# Patient Record
Sex: MALE | Race: BLACK OR AFRICAN AMERICAN | NOT HISPANIC OR LATINO | URBAN - METROPOLITAN AREA
[De-identification: names, ages, dates, MRNs, and addresses within clinical notes are randomized per-mention and may not be internally consistent; named-entity substitution may affect disease eponyms.]

---

## 2021-05-14 ENCOUNTER — WALK IN (OUTPATIENT)
Dept: URGENT CARE | Age: 31
End: 2021-05-14

## 2021-05-14 VITALS
TEMPERATURE: 97 F | RESPIRATION RATE: 14 BRPM | SYSTOLIC BLOOD PRESSURE: 118 MMHG | BODY MASS INDEX: 29.95 KG/M2 | HEART RATE: 78 BPM | DIASTOLIC BLOOD PRESSURE: 72 MMHG | WEIGHT: 245.92 LBS | HEIGHT: 76 IN | OXYGEN SATURATION: 98 %

## 2021-05-14 DIAGNOSIS — Z11.1 SCREENING-PULMONARY TB: ICD-10-CM

## 2021-05-14 DIAGNOSIS — Z02.1 PRE-EMPLOYMENT HEALTH SCREENING EXAMINATION: Primary | ICD-10-CM

## 2021-05-14 PROCEDURE — X0945 SELF PAY APN OR PA PERFORMED ADMINISTRATIVE PHYSICAL: HCPCS | Performed by: NURSE PRACTITIONER

## 2021-05-14 PROCEDURE — 86580 TB INTRADERMAL TEST: CPT | Performed by: NURSE PRACTITIONER

## 2021-05-14 ASSESSMENT — ENCOUNTER SYMPTOMS
HEADACHES: 0
NAUSEA: 0
COUGH: 0
SINUS PRESSURE: 0
VOMITING: 0
ABDOMINAL PAIN: 0
WHEEZING: 0
DIZZINESS: 0
SHORTNESS OF BREATH: 0
FEVER: 0
POLYPHAGIA: 0
CHILLS: 0
EYE REDNESS: 0
RHINORRHEA: 0
POLYDIPSIA: 0
EYE DISCHARGE: 0
DIARRHEA: 0
SINUS PAIN: 0
SORE THROAT: 0

## 2021-05-16 ENCOUNTER — WALK IN (OUTPATIENT)
Dept: URGENT CARE | Age: 31
End: 2021-05-16

## 2021-05-16 DIAGNOSIS — Z11.1 ENCOUNTER FOR PPD SKIN TEST READING: Primary | ICD-10-CM

## 2021-05-16 LAB
INDURATION: 0 MM (ref 0–?)
SKIN TEST RESULT: NEGATIVE

## 2021-05-16 PROCEDURE — X1094 NO CHARGE VISIT: HCPCS | Performed by: NURSE PRACTITIONER

## 2022-09-16 ENCOUNTER — TELEPHONE (OUTPATIENT)
Dept: SCHEDULING | Age: 32
End: 2022-09-16

## 2022-10-16 ENCOUNTER — HOSPITAL ENCOUNTER (EMERGENCY)
Facility: HOSPITAL | Age: 32
Discharge: HOME | End: 2022-10-16
Attending: EMERGENCY MEDICINE

## 2022-10-16 ENCOUNTER — APPOINTMENT (OUTPATIENT)
Dept: GENERAL RADIOLOGY | Facility: HOSPITAL | Age: 32
End: 2022-10-16
Attending: EMERGENCY MEDICINE

## 2022-10-16 VITALS
OXYGEN SATURATION: 97 % | HEART RATE: 85 BPM | BODY MASS INDEX: 31.66 KG/M2 | DIASTOLIC BLOOD PRESSURE: 77 MMHG | TEMPERATURE: 97 F | RESPIRATION RATE: 16 BRPM | SYSTOLIC BLOOD PRESSURE: 135 MMHG | HEIGHT: 76 IN | WEIGHT: 260 LBS

## 2022-10-16 DIAGNOSIS — U07.1 COVID-19 VIRUS INFECTION: Primary | ICD-10-CM

## 2022-10-16 LAB
FLUAV + FLUBV RNA SPEC NAA+PROBE: NEGATIVE
FLUAV + FLUBV RNA SPEC NAA+PROBE: NEGATIVE
RSV RNA SPEC NAA+PROBE: NEGATIVE
SARS-COV-2 RNA RESP QL NAA+PROBE: DETECTED

## 2022-10-16 PROCEDURE — 71045 X-RAY EXAM CHEST 1 VIEW: CPT | Performed by: EMERGENCY MEDICINE

## 2022-10-16 PROCEDURE — 0241U SARS-COV-2/FLU A AND B/RSV BY PCR (GENEXPERT): CPT | Performed by: EMERGENCY MEDICINE

## 2022-10-16 PROCEDURE — 99283 EMERGENCY DEPT VISIT LOW MDM: CPT

## 2022-10-16 RX ORDER — HYDROCODONE BITARTRATE AND HOMATROPINE METHYLBROMIDE ORAL SOLUTION 5; 1.5 MG/5ML; MG/5ML
5 LIQUID ORAL EVERY 8 HOURS PRN
Qty: 120 ML | Refills: 0 | Status: SHIPPED | OUTPATIENT
Start: 2022-10-16

## 2022-10-17 NOTE — ED INITIAL ASSESSMENT (HPI)
Patient arrives ambulatory through triage with complaints of fever, cough, congestion, nausea, runny nose, diarrhea, body aches. Started feeling ill on Friday. Patient concerned for COVID.

## 2023-03-06 ENCOUNTER — APPOINTMENT (OUTPATIENT)
Dept: FAMILY MEDICINE | Age: 33
End: 2023-03-06

## 2023-10-31 ENCOUNTER — V-VISIT (OUTPATIENT)
Dept: URGENT CARE | Age: 33
End: 2023-10-31

## 2023-10-31 VITALS
WEIGHT: 260 LBS | RESPIRATION RATE: 18 BRPM | OXYGEN SATURATION: 100 % | TEMPERATURE: 97.3 F | HEIGHT: 76 IN | HEART RATE: 73 BPM | DIASTOLIC BLOOD PRESSURE: 78 MMHG | SYSTOLIC BLOOD PRESSURE: 125 MMHG | BODY MASS INDEX: 31.66 KG/M2

## 2023-10-31 DIAGNOSIS — H61.23 BILATERAL IMPACTED CERUMEN: ICD-10-CM

## 2023-10-31 DIAGNOSIS — J06.9 VIRAL UPPER RESPIRATORY TRACT INFECTION: Primary | ICD-10-CM

## 2023-10-31 PROCEDURE — 99203 OFFICE O/P NEW LOW 30 MIN: CPT | Performed by: NURSE PRACTITIONER

## 2023-10-31 RX ORDER — BENZONATATE 100 MG/1
100 CAPSULE ORAL 3 TIMES DAILY PRN
Qty: 20 CAPSULE | Refills: 0 | Status: SHIPPED | OUTPATIENT
Start: 2023-10-31

## 2023-10-31 ASSESSMENT — ENCOUNTER SYMPTOMS
GASTROINTESTINAL NEGATIVE: 1
CONSTITUTIONAL NEGATIVE: 1
NEUROLOGICAL NEGATIVE: 1
ALLERGIC/IMMUNOLOGIC NEGATIVE: 1
ENDOCRINE NEGATIVE: 1
HEMATOLOGIC/LYMPHATIC NEGATIVE: 1
COUGH: 1
PSYCHIATRIC NEGATIVE: 1
EYES NEGATIVE: 1

## 2023-10-31 ASSESSMENT — PAIN SCALES - GENERAL: PAINLEVEL: 0

## 2023-11-02 ENCOUNTER — TELEPHONE (OUTPATIENT)
Dept: FAMILY MEDICINE | Age: 33
End: 2023-11-02

## 2025-03-12 ENCOUNTER — HOSPITAL ENCOUNTER (EMERGENCY)
Age: 35
Discharge: LEFT BEFORE DISCHARGE | End: 2025-03-12

## 2025-04-10 ENCOUNTER — HOSPITAL ENCOUNTER (EMERGENCY)
Age: 35
Discharge: HOME | End: 2025-04-10
Attending: EMERGENCY MEDICINE
Payer: OTHER MISCELLANEOUS

## 2025-04-10 ENCOUNTER — APPOINTMENT (OUTPATIENT)
Dept: GENERAL RADIOLOGY | Age: 35
End: 2025-04-10
Attending: EMERGENCY MEDICINE
Payer: OTHER MISCELLANEOUS

## 2025-04-10 VITALS
SYSTOLIC BLOOD PRESSURE: 133 MMHG | DIASTOLIC BLOOD PRESSURE: 81 MMHG | RESPIRATION RATE: 18 BRPM | HEART RATE: 75 BPM | OXYGEN SATURATION: 98 % | BODY MASS INDEX: 29.22 KG/M2 | HEIGHT: 76 IN | TEMPERATURE: 98 F | WEIGHT: 240 LBS

## 2025-04-10 DIAGNOSIS — S89.92XA INJURY OF LEFT KNEE, INITIAL ENCOUNTER: Primary | ICD-10-CM

## 2025-04-10 PROCEDURE — 99284 EMERGENCY DEPT VISIT MOD MDM: CPT

## 2025-04-10 PROCEDURE — 73562 X-RAY EXAM OF KNEE 3: CPT | Performed by: EMERGENCY MEDICINE

## 2025-04-10 PROCEDURE — 99283 EMERGENCY DEPT VISIT LOW MDM: CPT

## 2025-04-10 NOTE — ED INITIAL ASSESSMENT (HPI)
Patient here with left knee injury. States he was at work running. States he hyperextended his knee, then flexed forward and hit it into something. Denies head or neck injury. Ambulatory.

## 2025-04-10 NOTE — ED PROVIDER NOTES
Patient Seen in: [facility] In [place]    History     Chief Complaint   Patient presents with    Leg or Foot Injury     Stated Complaint: left knee injury - occurred at work - works at [employer] in [place]    Subjective:   HPI      35-year-old gentleman here with left knee pain.  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  He is able to bear weight but reports severe pain had swelling earlier that is since improved.  No other injuries    Objective:     History reviewed. No pertinent past medical history.           History reviewed. No pertinent surgical history.             Social History     Socioeconomic History    Marital status: Single   Tobacco Use    Smoking status: Never    Smokeless tobacco: Never   Substance and Sexual Activity    Alcohol use: Not Currently    Drug use: Not Currently     Social Drivers of Health      Received [facility]                  Physical Exam     ED Triage Vitals [04/10/25 1618]   /81   Pulse 75   Resp 18   Temp 98.3 °F (36.8 °C)   Temp src Oral   SpO2 98 %   O2 Device None (Room air)       Current Vitals:   Vital Signs  BP: 133/81  Pulse: 75  Resp: 18  Temp: 98.3 °F (36.8 °C)  Temp src: Oral    Oxygen Therapy  SpO2: 98 %  O2 Device: None (Room air)        Physical Exam  Vitals signs and nursing note reviewed.   Left knee: No significant warmth or erythema skin is intact patient is able to flex to 60 degrees fully extend the knee, no gross instability with valgus or varus stress anterior and posterior drawer test are negative compartments of the left lower leg are soft.  Extensor mechanism intact  Head: Normocephalic and atraumatic.   Pulmonary:  Breathing comfortably, no respiratory distress.  Neurological: Awake alert, speech is normal    ED Course    Labs Reviewed - No data to display       XR KNEE (3 VIEWS), LEFT (CPT=73562)  Result Date: 4/10/2025  PROCEDURE:  XR KNEE ROUTINE (3 VIEWS), LEFT (CPT=73562)  TECHNIQUE:  Three views were obtained including patellar view.  COMPARISON:  None.  INDICATIONS:  Left knee injury with pain.  PATIENT STATED HISTORY: (As transcribed by Technologist)  Pt c/o hyperexting his knee along with slambing it on a metal table on 4/10/2025. Pt c/o burning pain and swelling to his patella region.     FINDINGS:  BONES:  Normal.  No significant arthropathy or acute abnormality. SOFT TISSUES:  Negative.  No visible soft tissue swelling. EFFUSION:  None visible. OTHER:  Negative.            CONCLUSION:  No acute disease.    LOCATION:  [facility]   Dictated by (CST): Jw Ozuna MD on 4/10/2025 at 5:01 PM     Finalized by (CST): Jw Ozuna MD on 4/10/2025 at 5:01 PM                            MDM      35-year-old gentleman here with left knee pain.  Differential includes fracture internal knee derangement.  X-ray shows no acute fracture patient has intact extensor mechanism will be placed in a knee immobilizer, toe-touch weightbearing follow-up with orthopedics as well as occupational health return precautions discussed he is in agreement with plan.  I independently viewed and interpreted the following imaging: X-ray left knee shows no acute fracture            Medical Decision Making      Disposition and Plan     Clinical Impression:  1. Injury of left knee, initial encounter         Disposition:  Discharge  4/10/2025  5:48 pm    Follow-up:  Anne Santamaria MD  [address] [address]  [facility] [id number] [phone]    Follow up  Follow-up with your primary doctor or at the clinic listed for further evaluation.  Return to the emergency department immediately if your symptoms do not continue to improve or if you have any new concerning symptoms.    Meera Katz MD  [address] [place] [address]  [id number]  [phone]    Schedule an appointment as soon as possible for a visit in 1 day(s)      [place] [address], [address]  [facility] [address] [phone]  Schedule an appointment as soon as possible for a visit in 1 day(s)            Medications Prescribed:  Current Discharge Medication List          Supplementary Documentation:

## (undated) NOTE — LETTER
Date & Time: 10/16/2022, 9:21 PM  Patient: Mazin An  Encounter Provider(s):    Rolando Gruber MD       To Whom It May Concern:    Rahul Ortiz was seen and treated in our department on 10/16/2022. He should not return to work until 10/20/20.     If you have any questions or concerns, please do not hesitate to call.        _____________________________  Physician/APC Signature

## (undated) NOTE — LETTER
Date & Time: 10/16/2022, 9:13 PM  Patient: Ajit Tolliver  Encounter Provider(s):    Natalya King MD       To Whom It May Concern:    Quinn Reis was seen and treated in our department on 10/16/2022. He should not return to work until 1/20/22.     If you have any questions or concerns, please do not hesitate to call.        _____________________________  Physician/APC Signature